# Patient Record
(demographics unavailable — no encounter records)

---

## 2025-01-08 NOTE — HISTORY OF PRESENT ILLNESS
[FreeTextEntry1] : Patient presents today with her son, who acted as her , with a chief complaint of injuring her left ankle.  Two weeks ago, she fell down the stairs, was seen at [facility] and diagnosed with a small medial malleolar fracture that is mildly displaced at the left ankle.  There is no instability of the mortise.  She is here for evaluation.

## 2025-01-08 NOTE — PHYSICAL EXAM
[2+] : left foot dorsalis pedis 2+ [Skin Turgor] : normal skin turgor [Skin Lesions] : no skin lesions [Sensation] : the sensory exam was normal to light touch and pinprick [No Focal Deficits] : no focal deficits [Deep Tendon Reflexes (DTR)] : deep tendon reflexes were 2+ and symmetric [Motor Exam] : the motor exam was normal [General Appearance - Alert] : alert [Oriented To Time, Place, And Person] : oriented to person, place, and time [Ankle Swelling (On Exam)] : not present [Varicose Veins Of Lower Extremities] : not present [] : not present [Delayed in the Right Toes] : capillary refills normal in right toes [Delayed in the Left Toes] : capillary refills normal in the left toes [de-identified] : Minimal pain. [Foot Ulcer] : no foot ulcer [Skin Induration] : no skin induration [FreeTextEntry1] : Ecchymosis at the left ankle with mild swelling.

## 2025-01-08 NOTE — REASON FOR VISIT
[Initial Visit] : an initial visit for [Confirmed Foot Fracture] : confirmed foot fracture [Family Member] : family member

## 2025-01-08 NOTE — HISTORY OF PRESENT ILLNESS
[FreeTextEntry1] : Patient presents today with her son, who acted as her , with a chief complaint of injuring her left ankle.  Two weeks ago, she fell down the stairs, was seen at [facility] and diagnosed with a small medial malleolar fracture that is mildly displaced at the left ankle.  There is no instability of the mortise.  She is here for evaluation.

## 2025-01-08 NOTE — ASSESSMENT
[FreeTextEntry1] : Impression: Mildly displaced medial malleolar fracture, left ankle (S82.52XA).  Treatment: Due to the small nature of the fracture, patient was placed into a Cam boot.  Limit activities.  Explained to the patient and her son, that radiographically, the fracture may not heal but pain should resolve over time.  She can go to full weight bearing in the boot.  Will reevaluate in 3 weeks.  Any questions or problems, patient is to contact the office.

## 2025-01-08 NOTE — PROCEDURE
[FreeTextEntry1] : Independently reviewed x-rays from [facility]. There is a small medial malleolar fracture, left ankle.  No disruption of the fibula noted.

## 2025-01-08 NOTE — PHYSICAL EXAM
Future appt:  None  Last Appointment:  8/16/2016; Return to clinic in a year  Suggested date of next visit: 08/16/2017    No results found for: CHOLEST, HDL, LDL, TRIGLY, TRIG  No results found for: EAG, A1C  No results found for: T4F, TSH, TSHT4    Last L [2+] : left foot dorsalis pedis 2+ [Skin Turgor] : normal skin turgor [Skin Lesions] : no skin lesions [Sensation] : the sensory exam was normal to light touch and pinprick [No Focal Deficits] : no focal deficits [Deep Tendon Reflexes (DTR)] : deep tendon reflexes were 2+ and symmetric [Motor Exam] : the motor exam was normal [General Appearance - Alert] : alert [Oriented To Time, Place, And Person] : oriented to person, place, and time [Ankle Swelling (On Exam)] : not present [Varicose Veins Of Lower Extremities] : not present [] : not present [Delayed in the Right Toes] : capillary refills normal in right toes [Delayed in the Left Toes] : capillary refills normal in the left toes [de-identified] : Minimal pain. [Foot Ulcer] : no foot ulcer [Skin Induration] : no skin induration [FreeTextEntry1] : Ecchymosis at the left ankle with mild swelling.

## 2025-01-08 NOTE — PROCEDURE
[FreeTextEntry1] : Independently reviewed x-rays from [facility]. There is a small medial malleolar fracture, left ankle.  No disruption of the fibula noted.

## 2025-01-29 NOTE — REASON FOR VISIT
[Follow-Up Visit] : a follow-up visit for [Confirmed Foot Fracture] : confirmed foot fracture [Family Member] : family member

## 2025-01-30 NOTE — ASSESSMENT
[FreeTextEntry1] : Impression: Mildly displaced medial malleolar fracture, left ankle (S82.52XA).  Treatment: Due to the nature of the fracture, it may never unite radiographically but as long as the pain, then no intervention will be necessary.  She is to continue wearing the Cam boot.  Limit activities.  Will reevaluate in 2 weeks.  Any questions or problems, patient is to contact the office.

## 2025-01-30 NOTE — PHYSICAL EXAM
[2+] : left foot dorsalis pedis 2+ [Sensation] : the sensory exam was normal to light touch and pinprick [No Focal Deficits] : no focal deficits [Deep Tendon Reflexes (DTR)] : deep tendon reflexes were 2+ and symmetric [Motor Exam] : the motor exam was normal [General Appearance - Alert] : alert [Skin Turgor] : normal skin turgor [Skin Lesions] : no skin lesions [Oriented To Time, Place, And Person] : oriented to person, place, and time [Ankle Swelling (On Exam)] : not present [Varicose Veins Of Lower Extremities] : not present [] : not present [Delayed in the Right Toes] : capillary refills normal in right toes [Delayed in the Left Toes] : capillary refills normal in the left toes [de-identified] : Pain is reduced. [Foot Ulcer] : no foot ulcer [Skin Induration] : no skin induration [FreeTextEntry1] : Swelling is reduced.

## 2025-01-30 NOTE — PROCEDURE
[FreeTextEntry1] : X-rays were taken to evaluate healing. (2 views - left ankle) There is still lucency noted at the fracture, which is expected due to this type of injury.  Explained to the patient and her daughter that this may never radiographically unite.

## 2025-01-30 NOTE — PHYSICAL EXAM
[2+] : left foot dorsalis pedis 2+ [Sensation] : the sensory exam was normal to light touch and pinprick [No Focal Deficits] : no focal deficits [Deep Tendon Reflexes (DTR)] : deep tendon reflexes were 2+ and symmetric [Motor Exam] : the motor exam was normal [General Appearance - Alert] : alert [Skin Turgor] : normal skin turgor [Skin Lesions] : no skin lesions [Oriented To Time, Place, And Person] : oriented to person, place, and time [Ankle Swelling (On Exam)] : not present [Varicose Veins Of Lower Extremities] : not present [] : not present [Delayed in the Right Toes] : capillary refills normal in right toes [Delayed in the Left Toes] : capillary refills normal in the left toes [de-identified] : Pain is reduced. [Foot Ulcer] : no foot ulcer [Skin Induration] : no skin induration [FreeTextEntry1] : Swelling is reduced.

## 2025-01-30 NOTE — HISTORY OF PRESENT ILLNESS
[FreeTextEntry1] : Patient presents today with her daughter for reevaluation of medial malleolar fracture, left.  Patient is doing well.  No new changes to her medical status.

## 2025-01-30 NOTE — PHYSICAL EXAM
[2+] : left foot dorsalis pedis 2+ [Sensation] : the sensory exam was normal to light touch and pinprick [No Focal Deficits] : no focal deficits [Deep Tendon Reflexes (DTR)] : deep tendon reflexes were 2+ and symmetric [Motor Exam] : the motor exam was normal [General Appearance - Alert] : alert [Skin Turgor] : normal skin turgor [Skin Lesions] : no skin lesions [Oriented To Time, Place, And Person] : oriented to person, place, and time [Ankle Swelling (On Exam)] : not present [Varicose Veins Of Lower Extremities] : not present [] : not present [Delayed in the Right Toes] : capillary refills normal in right toes [Delayed in the Left Toes] : capillary refills normal in the left toes [de-identified] : Pain is reduced. [Foot Ulcer] : no foot ulcer [Skin Induration] : no skin induration [FreeTextEntry1] : Swelling is reduced.

## 2025-01-30 NOTE — ASSESSMENT
[FreeTextEntry1] : Impression: Mildly displaced medial malleolar fracture, left ankle (S82.52XA).  Treatment: Due to the nature of the fracture, it may never unite radiographically but as long as the pain, then no intervention will be necessary.  She is to continue wearing the Cam boot.  Limit activities.  Will reevaluate in 2 weeks.  Any questions or problems, patient is to contact the office. no

## 2025-02-12 NOTE — PROCEDURE
[FreeTextEntry1] : X-rays were taken to evaluate healing.   (3 views - left ankle) The medial malleolar fracture is healing satisfactorily and in good alignment.

## 2025-02-12 NOTE — HISTORY OF PRESENT ILLNESS
[FreeTextEntry1] : Patient presents today with her son for reevaluation of left ankle fracture.  Once again, the patient twisted the ankle.  She is wearing a boot.  She states that she has some swelling about the ankle but otherwise, is feeling much improved.  No new changes to her medical status.

## 2025-02-12 NOTE — PHYSICAL EXAM
[General Appearance - Alert] : alert [Oriented To Time, Place, And Person] : oriented to person, place, and time [Ankle Swelling (On Exam)] : not present [Varicose Veins Of Lower Extremities] : not present [] : not present [Delayed in the Right Toes] : capillary refills normal in right toes [Delayed in the Left Toes] : capillary refills normal in the left toes [de-identified] : Patient has no pain in the calf and no tenderness.  No swelling of the leg noted. [Foot Ulcer] : no foot ulcer [Skin Induration] : no skin induration [FreeTextEntry1] : Swelling is reduced.

## 2025-02-12 NOTE — ASSESSMENT
[FreeTextEntry1] : Impression: Mildly displaced medial malleolar fracture, left ankle (S82.52XA).  Treatment: Again, explained to the patient and her son that this may take on long time to radiographically heal.  She is to return in 2 weeks and at that time, will try to convert her to an ankle support.  She was advised to take a baby aspirin every day.  Limit activities. Will reevaluate in 2 weeks. Any questions or problems, patient is to contact the office.

## 2025-02-12 NOTE — PHYSICAL EXAM
[General Appearance - Alert] : alert [Oriented To Time, Place, And Person] : oriented to person, place, and time [Ankle Swelling (On Exam)] : not present [Varicose Veins Of Lower Extremities] : not present [] : not present [Delayed in the Right Toes] : capillary refills normal in right toes [Delayed in the Left Toes] : capillary refills normal in the left toes [de-identified] : Patient has no pain in the calf and no tenderness.  No swelling of the leg noted. [Foot Ulcer] : no foot ulcer [Skin Induration] : no skin induration [FreeTextEntry1] : Swelling is reduced.

## 2025-02-27 NOTE — PHYSICAL EXAM
[Ankle Swelling (On Exam)] : not present [Varicose Veins Of Lower Extremities] : not present [] : not present [Delayed in the Right Toes] : capillary refills normal in right toes [Delayed in the Left Toes] : capillary refills normal in the left toes [2+] : left foot dorsalis pedis 2+ [Sensation] : the sensory exam was normal to light touch and pinprick [No Focal Deficits] : no focal deficits [Deep Tendon Reflexes (DTR)] : deep tendon reflexes were 2+ and symmetric [Motor Exam] : the motor exam was normal

## 2025-04-03 NOTE — PHYSICAL EXAM
[General Appearance - Alert] : alert [General Appearance - In No Acute Distress] : in no acute distress [2+] : left foot dorsalis pedis 2+ [Skin Turgor] : normal skin turgor [Skin Lesions] : no skin lesions [Sensation] : the sensory exam was normal to light touch and pinprick [No Focal Deficits] : no focal deficits [Deep Tendon Reflexes (DTR)] : deep tendon reflexes were 2+ and symmetric [Motor Exam] : the motor exam was normal [Ankle Swelling (On Exam)] : not present [Varicose Veins Of Lower Extremities] : not present [] : not present [Delayed in the Right Toes] : capillary refills normal in right toes [Delayed in the Left Toes] : capillary refills normal in the left toes [de-identified] : Reduced pain.  There is increased motion.   [Foot Ulcer] : no foot ulcer [Skin Induration] : no skin induration [FreeTextEntry1] : Swelling is reduced.

## 2025-04-03 NOTE — PHYSICAL EXAM
[General Appearance - Alert] : alert [General Appearance - In No Acute Distress] : in no acute distress [2+] : left foot dorsalis pedis 2+ [Skin Turgor] : normal skin turgor [Skin Lesions] : no skin lesions [Sensation] : the sensory exam was normal to light touch and pinprick [No Focal Deficits] : no focal deficits [Deep Tendon Reflexes (DTR)] : deep tendon reflexes were 2+ and symmetric [Motor Exam] : the motor exam was normal [Ankle Swelling (On Exam)] : not present [Varicose Veins Of Lower Extremities] : not present [] : not present [Delayed in the Right Toes] : capillary refills normal in right toes [Delayed in the Left Toes] : capillary refills normal in the left toes [de-identified] : Reduced pain.  There is increased motion.   [Foot Ulcer] : no foot ulcer [Skin Induration] : no skin induration [FreeTextEntry1] : Swelling is reduced.

## 2025-04-03 NOTE — HISTORY OF PRESENT ILLNESS
[FreeTextEntry1] : Patient presents today with her son for reevaluation of medial malleolar fracture, left.  She is much improved.  No new changes to her medical status.

## 2025-04-03 NOTE — ASSESSMENT
[FreeTextEntry1] : Impression: Mildly displaced medial malleolar fracture, left ankle (S82.52XA).  Treatment: Did not feel a need for x-rays however, if she continues to have pain, she is to return to the office.  Otherwise, she is discharged.  Any questions or problems, patient is to contact the office.

## 2025-04-03 NOTE — PHYSICAL EXAM
[General Appearance - Alert] : alert [General Appearance - In No Acute Distress] : in no acute distress [2+] : left foot dorsalis pedis 2+ [Skin Turgor] : normal skin turgor [Skin Lesions] : no skin lesions [Sensation] : the sensory exam was normal to light touch and pinprick [No Focal Deficits] : no focal deficits [Deep Tendon Reflexes (DTR)] : deep tendon reflexes were 2+ and symmetric [Motor Exam] : the motor exam was normal [Ankle Swelling (On Exam)] : not present [Varicose Veins Of Lower Extremities] : not present [] : not present [Delayed in the Right Toes] : capillary refills normal in right toes [Delayed in the Left Toes] : capillary refills normal in the left toes [de-identified] : Reduced pain.  There is increased motion.   [Foot Ulcer] : no foot ulcer [Skin Induration] : no skin induration [FreeTextEntry1] : Swelling is reduced.